# Patient Record
Sex: MALE | Race: OTHER | HISPANIC OR LATINO | URBAN - METROPOLITAN AREA
[De-identification: names, ages, dates, MRNs, and addresses within clinical notes are randomized per-mention and may not be internally consistent; named-entity substitution may affect disease eponyms.]

---

## 2022-09-15 ENCOUNTER — EMERGENCY (EMERGENCY)
Facility: HOSPITAL | Age: 27
LOS: 1 days | Discharge: HOME | End: 2022-09-15
Attending: STUDENT IN AN ORGANIZED HEALTH CARE EDUCATION/TRAINING PROGRAM | Admitting: STUDENT IN AN ORGANIZED HEALTH CARE EDUCATION/TRAINING PROGRAM
Payer: MEDICAID

## 2022-09-15 VITALS
SYSTOLIC BLOOD PRESSURE: 138 MMHG | OXYGEN SATURATION: 100 % | HEIGHT: 70 IN | TEMPERATURE: 98 F | HEART RATE: 102 BPM | RESPIRATION RATE: 17 BRPM | WEIGHT: 154.98 LBS | DIASTOLIC BLOOD PRESSURE: 78 MMHG

## 2022-09-15 VITALS
DIASTOLIC BLOOD PRESSURE: 82 MMHG | SYSTOLIC BLOOD PRESSURE: 140 MMHG | TEMPERATURE: 98 F | HEART RATE: 75 BPM | OXYGEN SATURATION: 99 % | RESPIRATION RATE: 18 BRPM

## 2022-09-15 LAB
ANION GAP SERPL CALC-SCNC: 12 MMOL/L — SIGNIFICANT CHANGE UP (ref 5–17)
APTT BLD: 29.3 SEC — SIGNIFICANT CHANGE UP (ref 27.5–35.5)
BASOPHILS # BLD AUTO: 0.02 K/UL — SIGNIFICANT CHANGE UP (ref 0–0.2)
BASOPHILS NFR BLD AUTO: 0.3 % — SIGNIFICANT CHANGE UP (ref 0–2)
BLD GP AB SCN SERPL QL: NEGATIVE — SIGNIFICANT CHANGE UP
BUN SERPL-MCNC: 16 MG/DL — SIGNIFICANT CHANGE UP (ref 7–23)
CALCIUM SERPL-MCNC: 9.4 MG/DL — SIGNIFICANT CHANGE UP (ref 8.4–10.5)
CHLORIDE SERPL-SCNC: 103 MMOL/L — SIGNIFICANT CHANGE UP (ref 96–108)
CO2 SERPL-SCNC: 28 MMOL/L — SIGNIFICANT CHANGE UP (ref 22–31)
CREAT SERPL-MCNC: 1.16 MG/DL — SIGNIFICANT CHANGE UP (ref 0.5–1.3)
EGFR: 87 ML/MIN/1.73M2 — SIGNIFICANT CHANGE UP
EOSINOPHIL # BLD AUTO: 0.04 K/UL — SIGNIFICANT CHANGE UP (ref 0–0.5)
EOSINOPHIL NFR BLD AUTO: 0.5 % — SIGNIFICANT CHANGE UP (ref 0–6)
GLUCOSE SERPL-MCNC: 81 MG/DL — SIGNIFICANT CHANGE UP (ref 70–99)
HCT VFR BLD CALC: 46.5 % — SIGNIFICANT CHANGE UP (ref 39–50)
HGB BLD-MCNC: 16.1 G/DL — SIGNIFICANT CHANGE UP (ref 13–17)
IMM GRANULOCYTES NFR BLD AUTO: 0.1 % — SIGNIFICANT CHANGE UP (ref 0–0.9)
INR BLD: 1.06 — SIGNIFICANT CHANGE UP (ref 0.88–1.16)
LYMPHOCYTES # BLD AUTO: 2.22 K/UL — SIGNIFICANT CHANGE UP (ref 1–3.3)
LYMPHOCYTES # BLD AUTO: 28.8 % — SIGNIFICANT CHANGE UP (ref 13–44)
MCHC RBC-ENTMCNC: 32.4 PG — SIGNIFICANT CHANGE UP (ref 27–34)
MCHC RBC-ENTMCNC: 34.6 GM/DL — SIGNIFICANT CHANGE UP (ref 32–36)
MCV RBC AUTO: 93.6 FL — SIGNIFICANT CHANGE UP (ref 80–100)
MONOCYTES # BLD AUTO: 0.71 K/UL — SIGNIFICANT CHANGE UP (ref 0–0.9)
MONOCYTES NFR BLD AUTO: 9.2 % — SIGNIFICANT CHANGE UP (ref 2–14)
NEUTROPHILS # BLD AUTO: 4.71 K/UL — SIGNIFICANT CHANGE UP (ref 1.8–7.4)
NEUTROPHILS NFR BLD AUTO: 61.1 % — SIGNIFICANT CHANGE UP (ref 43–77)
NRBC # BLD: 0 /100 WBCS — SIGNIFICANT CHANGE UP (ref 0–0)
PLATELET # BLD AUTO: 251 K/UL — SIGNIFICANT CHANGE UP (ref 150–400)
POTASSIUM SERPL-MCNC: 3.8 MMOL/L — SIGNIFICANT CHANGE UP (ref 3.5–5.3)
POTASSIUM SERPL-SCNC: 3.8 MMOL/L — SIGNIFICANT CHANGE UP (ref 3.5–5.3)
PROTHROM AB SERPL-ACNC: 12.6 SEC — SIGNIFICANT CHANGE UP (ref 10.5–13.4)
RBC # BLD: 4.97 M/UL — SIGNIFICANT CHANGE UP (ref 4.2–5.8)
RBC # FLD: 11.9 % — SIGNIFICANT CHANGE UP (ref 10.3–14.5)
RH IG SCN BLD-IMP: POSITIVE — SIGNIFICANT CHANGE UP
SARS-COV-2 RNA SPEC QL NAA+PROBE: NEGATIVE — SIGNIFICANT CHANGE UP
SODIUM SERPL-SCNC: 143 MMOL/L — SIGNIFICANT CHANGE UP (ref 135–145)
WBC # BLD: 7.71 K/UL — SIGNIFICANT CHANGE UP (ref 3.8–10.5)
WBC # FLD AUTO: 7.71 K/UL — SIGNIFICANT CHANGE UP (ref 3.8–10.5)

## 2022-09-15 PROCEDURE — 72193 CT PELVIS W/DYE: CPT | Mod: 26,MA

## 2022-09-15 PROCEDURE — 99285 EMERGENCY DEPT VISIT HI MDM: CPT

## 2022-09-15 RX ORDER — MORPHINE SULFATE 50 MG/1
4 CAPSULE, EXTENDED RELEASE ORAL ONCE
Refills: 0 | Status: DISCONTINUED | OUTPATIENT
Start: 2022-09-15 | End: 2022-09-15

## 2022-09-15 RX ORDER — MORPHINE SULFATE 50 MG/1
6 CAPSULE, EXTENDED RELEASE ORAL ONCE
Refills: 0 | Status: DISCONTINUED | OUTPATIENT
Start: 2022-09-15 | End: 2022-09-15

## 2022-09-15 RX ORDER — FENTANYL CITRATE 50 UG/ML
50 INJECTION INTRAVENOUS ONCE
Refills: 0 | Status: DISCONTINUED | OUTPATIENT
Start: 2022-09-15 | End: 2022-09-15

## 2022-09-15 RX ADMIN — MORPHINE SULFATE 4 MILLIGRAM(S): 50 CAPSULE, EXTENDED RELEASE ORAL at 22:48

## 2022-09-15 RX ADMIN — MORPHINE SULFATE 6 MILLIGRAM(S): 50 CAPSULE, EXTENDED RELEASE ORAL at 16:50

## 2022-09-15 RX ADMIN — MORPHINE SULFATE 6 MILLIGRAM(S): 50 CAPSULE, EXTENDED RELEASE ORAL at 17:30

## 2022-09-15 NOTE — ED PROVIDER NOTE - NSFOLLOWUPINSTRUCTIONS_ED_ALL_ED_FT
Please follow up with a surgeon for further evaluatiuon in a few days    Anorectal Abscess      An abscess is an infected area that contains a collection of pus. An anorectal abscess is an abscess that is near the opening of the anus or around the rectum. Without treatment, an anorectal abscess can become larger and cause other problems, such as a more serious body-wide infection or pain, especially during bowel movements.      What are the causes?    This condition is caused by plugged glands or an infection in one of these areas:  •The anus.      •The area between the anus and the scrotum in males or between the anus and the vagina in females (perineum).        What increases the risk?    The following factors may make you more likely to develop this condition:  •Diabetes or inflammatory bowel disease.      •Having a body defense system (immune system) that is weak.      •Engaging in anal sex.      •Having a sexually transmitted infection (STI).      •Certain kinds of cancer, such as rectal carcinoma, leukemia, or lymphoma.        What are the signs or symptoms?    The main symptom of this condition is pain. The pain may be a throbbing pain that gets worse during bowel movements. Other symptoms include:  •Swelling and redness in the area of the abscess. The redness may go beyond the abscess and appear as a red streak on the skin.      •A visible, painful lump, or a lump that can be felt when touched.      •Bleeding or pus-like discharge from the area.      •Fever.      •General weakness.      •Constipation.      •Diarrhea.        How is this diagnosed?    This condition is diagnosed based on your medical history and a physical exam of the affected area.  •This may involve examining the rectal area with a gloved hand (digital rectal exam).      •Sometimes, the health care provider needs to look into the rectum using a probe, scope, or imaging test.      •For women, it may require a careful vaginal exam.        How is this treated?    Treatment for this condition may include:  •Incision and drainage surgery. This involves making an incision over the abscess to drain the pus.      •Medicines, including antibiotic medicine, pain medicine, stool softeners, or laxatives.        Follow these instructions at home:    Medicines     •Take over-the-counter and prescription medicines only as told by your health care provider.      •If you were prescribed an antibiotic medicine, use it as told by your health care provider. Do not stop using the antibiotic even if you start to feel better.      • Do not drive or use heavy machinery while taking prescription pain medicine.        Wound care   Two stitched wounds. One is normal. The other is red with pus and infected.   •If gauze was used in the abscess, follow instructions from your health care provider about removing or changing the gauze. It can usually be removed in 2–3 days.      •Wash your hands with soap and water before you remove or change your gauze. If soap and water are not available, use hand .      •If one or more drains were placed in the abscess cavity, be careful not to pull at them. Your health care provider will tell you how long they need to remain in place.    •Check your incision area every day for signs of infection. Check for:  •More redness, swelling, or pain.      •More fluid or blood.      •Warmth.      •Pus or a bad smell.          Managing pain, stiffness, and swelling   Bag of ice on a towel on the skin.   •Take a sitz bath 3–4 times a day and after bowel movements. This will help reduce pain and swelling.    •To relieve pain, try sitting:  •On a heating pad with the setting on low.      •On an inflatable donut-shaped cushion.      •If directed, put ice on the affected area:  •Put ice in a plastic bag.      •Place a towel between your skin and the bag.      •Leave the ice on for 20 minutes, 2–3 times a day.        General instructions     •Follow any diet instructions given by your health care provider.      •Keep all follow-up visits as told by your health care provider. This is important.        Contact a health care provider if you have:    •Bleeding from your incision.      •Pain, swelling, or redness that does not improve or gets worse.      •Trouble passing stool or urine.      •Symptoms that return after treatment.        Get help right away if you:    •Have problems moving or using your legs.      •Have severe or increasing pain.      •Have swelling in the affected area that suddenly gets worse.      •Have a large increase in bleeding or passing of pus.      •Develop chills or a fever.        Summary    •An anorectal abscess is an abscess that is near the opening of the anus or around the rectum. An abscess is an infected area that contains a collection of pus.      •The main symptom of this condition is pain. It may be a throbbing pain that gets worse during bowel movements.      •Treatment for an anorectal abscess may include surgery to drain the pus from the abscess. Medicines and sitz baths may also be a part of your treatment plan.      This information is not intended to replace advice given to you by your health care provider. Make sure you discuss any questions you have with your health care provider.

## 2022-09-15 NOTE — ED PROVIDER NOTE - PHYSICAL EXAMINATION
General: comfortable, resting in ED  HEENT: atraumatic, no eye erythema or discharge  Pulm: no cyanosis, no added work of breathing  Cardiac: extremities warm, intact peripheral pulse  GI: no abdominal distension, abdomen nontender  (with RN Maria E): 1cm focal area of swelling with extreme tenderness to light palpation, no tenderness or crepitus over perineum or other perirectal field  Neuro: alert, conversant  Psych: neutral affect, cooperative  Msk: no gross deformity or instability  Skin: no erythema or rash

## 2022-09-15 NOTE — ED PROVIDER NOTE - CARE PROVIDERS DIRECT ADDRESSES
,DirectAddress_Unknown,[email or web address]

## 2022-09-15 NOTE — ED PROVIDER NOTE - CARE PROVIDER_API CALL
Christian Ferraro)  ColonRectal Surgery; Surgery  [address] [place] [address], [address]  [place] [address]  Phone: [phone]  Fax: [phone]  Follow Up Time:     Shemar Ortega)  Surgery  [address], [place] [address]  Phone: [phone]  Fax: [phone]  Follow Up Time:

## 2022-09-15 NOTE — ED PROVIDER NOTE - PATIENT PORTAL LINK FT
You can access the FollowMyHealth Patient Portal offered by [facility] by registering at the following website: [email or web address]. By joining [email or web address]’s FollowMyHealth portal, you will also be able to view your health information using other applications (apps) compatible with our system.

## 2022-09-15 NOTE — ED PROVIDER NOTE - CLINICAL SUMMARY MEDICAL DECISION MAKING FREE TEXT BOX
Given severe pain and tenderness of focal area, with swelling, with history of abscess, concern for perirectal abscess.  Will discuss case with surgery, characterize depth of infection with CT imaging.  Will treat pain.  No sign of Bryson's or other diffuse cellulitis/fascitis at this time, however CT results pending for further diagnostic clarity.

## 2022-09-15 NOTE — ED PROVIDER NOTE - OBJECTIVE STATEMENT
27M with history of perirectal abscess, no other medical history, now with recurrent severe pain and swelling for 2 days in the perirectal area, concerned about recurrent abscess.

## 2022-09-16 LAB
GRAM STN FLD: SIGNIFICANT CHANGE UP
SPECIMEN SOURCE: SIGNIFICANT CHANGE UP

## 2022-09-16 PROCEDURE — 36415 COLL VENOUS BLD VENIPUNCTURE: CPT

## 2022-09-16 PROCEDURE — 86850 RBC ANTIBODY SCREEN: CPT

## 2022-09-16 PROCEDURE — 85610 PROTHROMBIN TIME: CPT

## 2022-09-16 PROCEDURE — 96374 THER/PROPH/DIAG INJ IV PUSH: CPT | Mod: XU

## 2022-09-16 PROCEDURE — 87070 CULTURE OTHR SPECIMN AEROBIC: CPT

## 2022-09-16 PROCEDURE — 87635 SARS-COV-2 COVID-19 AMP PRB: CPT

## 2022-09-16 PROCEDURE — 86900 BLOOD TYPING SEROLOGIC ABO: CPT

## 2022-09-16 PROCEDURE — 99284 EMERGENCY DEPT VISIT MOD MDM: CPT | Mod: 25

## 2022-09-16 PROCEDURE — 85730 THROMBOPLASTIN TIME PARTIAL: CPT

## 2022-09-16 PROCEDURE — 46050 I&D PERIANAL ABSCESS SUPFC: CPT

## 2022-09-16 PROCEDURE — 85025 COMPLETE CBC W/AUTO DIFF WBC: CPT

## 2022-09-16 PROCEDURE — 96376 TX/PRO/DX INJ SAME DRUG ADON: CPT | Mod: XU

## 2022-09-16 PROCEDURE — 86901 BLOOD TYPING SEROLOGIC RH(D): CPT

## 2022-09-16 PROCEDURE — 87205 SMEAR GRAM STAIN: CPT

## 2022-09-16 PROCEDURE — 72193 CT PELVIS W/DYE: CPT | Mod: MA

## 2022-09-16 PROCEDURE — 80048 BASIC METABOLIC PNL TOTAL CA: CPT

## 2022-09-16 RX ORDER — IBUPROFEN 200 MG
1 TABLET ORAL
Qty: 21 | Refills: 0
Start: 2022-09-16 | End: 2022-09-22

## 2022-09-16 RX ORDER — OXYCODONE AND ACETAMINOPHEN 5; 325 MG/1; MG/1
1 TABLET ORAL
Qty: 10 | Refills: 0
Start: 2022-09-16 | End: 2022-09-20

## 2022-09-16 NOTE — CONSULT NOTE ADULT - SUBJECTIVE AND OBJECTIVE BOX
ID: This is a 27-yo male with prior perianal abscess S/P I&D (~ 1 year ago in [place]) presenting with similar perianal pain for whom surgery was consulted for likely perianal abscess.     HPI:   He has had perianal pain for about two days with mild amount of spontaneous drainage. He denies F/C, abdominal pain, pelvic pain. He denies instrumentation or trauma to area. He has not been sexually active for > 2 weeks (his partner was out of town). He is on PREP with no known HIV positivity. He notes perianal pain once in the past when living in [place] 1 year ago where he was diagnosed with perianal abscess and underwent drainage procedure at bedside. He was not admitted and was not arranged for follow-up with a colorectal surgeon.    - Prior work-up in ED: labs without leukocytosis; CT Pelvis with simple perianal abscess.  - PMH: Perianal abscess  - PSH: Bedside I&D of perianal abscess (1 year ago)      PAST MEDICAL & SURGICAL HISTORY:  No pertinent past medical history      No significant past surgical history          REVIEW OF SYSTEMS    General: no F/C  Neuro: No seizures, focal neurologic symptoms  Psych: No mood changes  CV: No CP, SOB  Pulm: No SOB, coughing  GI: No N/V, abdominal pain. No diarrhea. + perianal pain.  : No dysuria  Heme: No weakness, easy bruising.  Skin: No rashes  Lymph: No swelling    Allergies    No Known Allergies    Intolerances        SOCIAL HISTORY:    FAMILY HISTORY:      Vital Signs Last 24 Hrs  T(C): 36.9 (15 Sep 2022 15:54), Max: 36.9 (15 Sep 2022 15:54)  T(F): 98.4 (15 Sep 2022 15:54), Max: 98.4 (15 Sep 2022 15:54)  HR: 102 (15 Sep 2022 15:54) (102 - 102)  BP: 138/78 (15 Sep 2022 15:54) (138/78 - 138/78)  BP(mean): --  RR: 17 (15 Sep 2022 15:54) (17 - 17)  SpO2: 100% (15 Sep 2022 15:54) (100% - 100%)    Parameters below as of 15 Sep 2022 15:54  Patient On (Oxygen Delivery Method): room air        PHYSICAL EXAMINATION    Gen: Well-appearing 27-yo male in NAD  Neurologic: AAOx3  CV: Normal rate, regular rhythm  Pulm: Breathing comfortably  Abd: Soft, non-distended; No TTP throughout.   Perianal: QUAN deferred by patient. Apparent ~ 1 cm fluctuant area in posterior perianal skin. Markedly TTP.  : No Wick  Skin: No rashes  Extremities: No edema.  Psychiatric: Interacting normally    Examination Chaperoned by RN.     LABS:                        16.1   7.71  )-----------( 251      ( 15 Sep 2022 16:35 )             46.5     09-15    143  |  103  |  16  ----------------------------<  81  3.8   |  28  |  1.16    Ca    9.4      15 Sep 2022 16:35      PT/INR - ( 15 Sep 2022 16:35 )   PT: 12.6 sec;   INR: 1.06          PTT - ( 15 Sep 2022 16:35 )  PTT:29.3 sec      RADIOLOGY & ADDITIONAL STUDIES:  CT Pelvis:   IMPRESSION:  A 2.5 cm rim-enhancing fluid collection in the perianal region is consistent with a perianal abscess.

## 2022-09-16 NOTE — CONSULT NOTE ADULT - ASSESSMENT
This is a 27-yo male with prior perianal abscess S/P I&D (~ 1 year ago in [place]) presenting with similar perianal pain for whom surgery was consulted for likely perianal abscess, amenable to bedside drainage.     - Drained at bedside (see Procedure Note).   - F/U culture for contingent course of ABx for lack of improvement.   - No ABx indicated.   - Please provide several days supply of pain killers (eg, oxycodone-acetaminophen, 5mg-325mg) x 12 tablets.  - Counseled he should utilize Sitz baths TID or as often as he'd like for comfort.  - [facility], 16 September 2022 (leaving for [place] in two days). For directions, please call hospital and ask them to page general surgery on call resident.

## 2022-09-17 DIAGNOSIS — Z20.822 CONTACT WITH AND (SUSPECTED) EXPOSURE TO COVID-19: ICD-10-CM

## 2022-09-17 DIAGNOSIS — K61.1 RECTAL ABSCESS: ICD-10-CM

## 2022-09-18 LAB
CULTURE RESULTS: SIGNIFICANT CHANGE UP
SPECIMEN SOURCE: SIGNIFICANT CHANGE UP

## 2022-11-04 NOTE — ED ADULT NURSE NOTE - NS ED NURSE LEVEL OF CONSCIOUSNESS AFFECT
82 y/o male presents for suture removal s/p left forehead lac 5 days. ago. Denies any c/o Calm 80 y/o male presents for suture removal s/p left forehead lac 5 days. ago. Denies any other complaints